# Patient Record
Sex: MALE | Employment: UNEMPLOYED | URBAN - NONMETROPOLITAN AREA
[De-identification: names, ages, dates, MRNs, and addresses within clinical notes are randomized per-mention and may not be internally consistent; named-entity substitution may affect disease eponyms.]

---

## 2017-02-02 ENCOUNTER — TELEMEDICINE2 (OUTPATIENT)
Dept: MEDICAL GROUP | Facility: PHYSICIAN GROUP | Age: 57
End: 2017-02-02
Payer: OTHER GOVERNMENT

## 2017-02-02 VITALS
SYSTOLIC BLOOD PRESSURE: 117 MMHG | OXYGEN SATURATION: 97 % | RESPIRATION RATE: 18 BRPM | HEART RATE: 56 BPM | BODY MASS INDEX: 38.6 KG/M2 | DIASTOLIC BLOOD PRESSURE: 80 MMHG | HEIGHT: 74 IN | WEIGHT: 300.8 LBS

## 2017-02-02 DIAGNOSIS — B20 HIV (HUMAN IMMUNODEFICIENCY VIRUS INFECTION) (HCC): ICD-10-CM

## 2017-02-02 PROCEDURE — 99213 OFFICE O/P EST LOW 20 MIN: CPT | Mod: GT | Performed by: NURSE PRACTITIONER

## 2017-02-02 NOTE — PROGRESS NOTES
"Chief Complaint   Patient presents with   • HIV Positive/AIDS       TELEMEDICINE VISIT    HISTORY OF PRESENT ILLNESS: Patient is a 56 y.o. male established patient, who presents today to discuss:    Verified Identification with patient.    Secured visit with RN presenter @ [facility]    HIV (human immunodeficiency virus infection)  This is a 56 your old male who is here to review labs. He has been under some stress with his family.   CD4 840 and viral load is a 90.  Remainder of labs are within normal limits. Patient is Hep C +, does not have viral load to document whether he has cleared the disease.   HIV ROS     Medication:   Current outpatient prescriptions:   •  Multiple Vitamin (MULTIVITAMINS PO), Take  by mouth., 2/2/2017  •  levothyroxine, 125 mcg, Oral, AM ES, 2/2/2017  •  Levothyroxine Sodium, Take  by mouth., 2/2/2017  •  efavirenz-emtrictabine-tenofovir, 1 Tab, Oral, DAILY, 2/2/2017  •  ibuprofen, 400 mg, Oral, Q6HRS PRN, 2/2/2017    Missed medications: NONE    Weight loss? NONE    Night sweats? NONE  Body aches ?  NONE    Skin ? CLEAR  Nonhealing lesions ? NO  Rash  ? NO  Warts  ? NO     Neuro: No headache, No sensation changes, No dizziness, No confusion.  Cardiac: No cp, No Harden, No peripheral edema. No calf pain at rest.  Pulm: No cough, No sob, No sputum   Gastro: No nausea, No vomiting,No diarrhea, No rectal bleeding, No abdominal pain  : No dysuria. No blisters, No incontinence.   Constitutional : No fevers, No night sweats.  Musculoskeletal: No swelling,redness or pain to joints. Normal ROM and gait  Skin: rashes, lesions, itching  Emotional: depression ? NO  anxiety ? NO            No Known Allergies            ROS: As documented in my HPI      Exam:  Blood pressure 117/80, pulse 56, resp. rate 18, height 1.88 m (6' 2.02\"), weight 136.442 kg (300 lb 12.8 oz), SpO2 97 %.  General:  Well nourished, well developed male in NAD  Head: No lesions, Non tender scalp  Neck: Supple. Thyroid is symmetric. No " lymphadenopathy   Oral Cavity: no thrush or gum lesions  Pulmonary: .  Normal effort. No rales, ronchi, or wheezing via Telesteth system  Cardiovascular: Regular rate and rhythm without murmur.   Extremities: no clubbing, cyanosis, or edema.  Psych: Alert and oriented x3. Normal mood and affect.  Neurological: No focal deficits    Please note that this dictation was created using voice recognition software. I have made every reasonable attempt to correct obvious errors, but I expect that there are errors of grammar and possibly content that I did not discover before finalizing the note.    Assessment/Plan:  1. HIV (human immunodeficiency virus infection) (CMS-HCC)  efavirenz-emtrictabine-tenofovir (ATRIPLA) 600-200-300 MG per tablet   Current status of condition is chronic and controlled on therapy.  Return to clinic in 3 months  Refill HIV meds  Recheck CD4 , VL, CBC, and CMP in 3 months

## 2017-02-02 NOTE — ASSESSMENT & PLAN NOTE
This is a 56 your old male who is here to review labs. He has been under some stress with his family.   CD4 840 and viral load is a 90.  Remainder of labs are within normal limits. Patient is Hep C +, does not have viral load to document whether he has cleared the disease.   HIV ROS     Medication:   Current outpatient prescriptions:   •  Multiple Vitamin (MULTIVITAMINS PO), Take  by mouth., 2/2/2017  •  levothyroxine, 125 mcg, Oral, AM ES, 2/2/2017  •  Levothyroxine Sodium, Take  by mouth., 2/2/2017  •  efavirenz-emtrictabine-tenofovir, 1 Tab, Oral, DAILY, 2/2/2017  •  ibuprofen, 400 mg, Oral, Q6HRS PRN, 2/2/2017    Missed medications: NONE    Weight loss? NONE    Night sweats? NONE  Body aches ?  NONE    Skin ? CLEAR  Nonhealing lesions ? NO  Rash  ? NO  Warts  ? NO     Neuro: No headache, No sensation changes, No dizziness, No confusion.  Cardiac: No cp, No Harden, No peripheral edema. No calf pain at rest.  Pulm: No cough, No sob, No sputum   Gastro: No nausea, No vomiting,No diarrhea, No rectal bleeding, No abdominal pain  : No dysuria. No blisters, No incontinence.   Constitutional : No fevers, No night sweats.  Musculoskeletal: No swelling,redness or pain to joints. Normal ROM and gait  Skin: rashes, lesions, itching  Emotional: depression ? NO  anxiety ? NO

## 2017-07-26 ENCOUNTER — TELEMEDICINE2 (OUTPATIENT)
Dept: MEDICAL GROUP | Facility: PHYSICIAN GROUP | Age: 57
End: 2017-07-26
Payer: OTHER GOVERNMENT

## 2017-07-26 VITALS
BODY MASS INDEX: 38.17 KG/M2 | SYSTOLIC BLOOD PRESSURE: 125 MMHG | TEMPERATURE: 98.4 F | HEART RATE: 56 BPM | OXYGEN SATURATION: 97 % | DIASTOLIC BLOOD PRESSURE: 77 MMHG | HEIGHT: 74 IN | WEIGHT: 297.4 LBS | RESPIRATION RATE: 18 BRPM

## 2017-07-26 DIAGNOSIS — B20 HIV (HUMAN IMMUNODEFICIENCY VIRUS INFECTION) (HCC): ICD-10-CM

## 2017-07-26 DIAGNOSIS — B18.2 HEP C W/ COMA, CHRONIC: ICD-10-CM

## 2017-07-26 PROCEDURE — 99213 OFFICE O/P EST LOW 20 MIN: CPT | Mod: GT | Performed by: NURSE PRACTITIONER

## 2017-07-26 NOTE — ASSESSMENT & PLAN NOTE
This is a 57 year old male. Patient has questions regarding his hepatitis. Reviewed numbers with patient. Reviewed diet and exercise.   HIV ROS     Medication: Atripla   Missed medications:  none    CD4 799   Viral Load < 20   Kidney Function normal      Weight loss?  3 lbs     Night sweats? No   Body aches ?   No     Skin ?  Clear  Nonhealing lesions ?  No  Rash  ?  No  Warts  ?  No     Neuro: No headache, No sensation changes, No dizziness, No confusion.  Cardiac: No cp, No Harden, No peripheral edema. No calf pain at rest.  Pulm: No cough, No sob, No sputum   Gastro: No nausea, No vomiting,No diarrhea, No rectal bleeding, No abdominal pain  : No dysuria. No blisters, No incontinence.   Constitutional : No fevers, No night sweats.  Musculoskeletal: No swelling,redness or pain to joints. Normal ROM and gait  Skin: rashes, lesions, itching  Emotional: depression ? No  anxiety ?  No    Psych: hallucinations ?   No   Auditory hallucinations ?  No  Paranoid  ?  No     DRUG ---DRUG interaction? No     Patient is almost not believing that his labs are so good.

## 2017-07-26 NOTE — PROGRESS NOTES
"Chief Complaint   Patient presents with   • HIV Positive/AIDS           HISTORY OF PRESENT ILLNESS: Patient is a 57 y.o. male established patient, who presents today to discuss    Verified Identification with patient.   RN presenter @  [facility]    Hep C w/ coma, chronic  Patient had Hepatitis viral load     HIV (human immunodeficiency virus infection)  This is a 57 year old male. Patient has questions regarding his hepatitis. Reviewed numbers with patient. Reviewed diet and exercise.   HIV ROS     Medication: Atripla   Missed medications:  none    CD4 799   Viral Load < 20   Kidney Function normal      Weight loss?  3 lbs     Night sweats? No   Body aches ?   No     Skin ?  Clear  Nonhealing lesions ?  No  Rash  ?  No  Warts  ?  No     Neuro: No headache, No sensation changes, No dizziness, No confusion.  Cardiac: No cp, No Harden, No peripheral edema. No calf pain at rest.  Pulm: No cough, No sob, No sputum   Gastro: No nausea, No vomiting,No diarrhea, No rectal bleeding, No abdominal pain  : No dysuria. No blisters, No incontinence.   Constitutional : No fevers, No night sweats.  Musculoskeletal: No swelling,redness or pain to joints. Normal ROM and gait  Skin: rashes, lesions, itching  Emotional: depression ? No  anxiety ?  No    Psych: hallucinations ?   No   Auditory hallucinations ?  No  Paranoid  ?  No     DRUG ---DRUG interaction? No     Patient is almost not believing that his labs are so good.         Patient - states that his glass prescription is not working - needs to follow up with EYE doctor.   No Known Allergies            ROS: As documented in my HPI      Exam:  Blood pressure 125/77, pulse 56, temperature 36.9 °C (98.4 °F), resp. rate 18, height 1.88 m (6' 2.02\"), weight 134.9 kg (297 lb 6.4 oz), SpO2 97 %.  General:  Well nourished, well developed male in NAD  Head: No lesions, Non tender scalp  Neck: Supple. Thyroid is symmetric. No lymphadenopathy   Oral Cavity: no thrush or gum lesions  Pulmonary: . "  Normal effort. No rales, ronchi, or wheezing via Telesteth system  Cardiovascular: Regular rate and rhythm without murmur.   Extremities: no clubbing, cyanosis, or edema.  Psych: Alert and oriented x3. Normal mood and affect.  Neurological: No focal deficits    Please note that this dictation was created using voice recognition software. I have made every reasonable attempt to correct obvious errors, but I expect that there are errors of grammar and possibly content that I did not discover before finalizing the note.    Assessment/Plan:  1. Hep C w/ coma, chronic (CMS-HCC)  Viral load detected. No janak type, but will monitored   2. HIV (human immunodeficiency virus infection) (CMS-HCC)  Current status of condition is chronic and controlled on therapy.  Return to clinic in 3 months  Refill HIV meds  Recheck CD4 , VL, CBC, and CMP in 3 months

## 2017-10-25 ENCOUNTER — TELEMEDICINE2 (OUTPATIENT)
Dept: MEDICAL GROUP | Facility: PHYSICIAN GROUP | Age: 57
End: 2017-10-25
Payer: OTHER GOVERNMENT

## 2017-10-25 VITALS
OXYGEN SATURATION: 97 % | SYSTOLIC BLOOD PRESSURE: 110 MMHG | TEMPERATURE: 97.1 F | DIASTOLIC BLOOD PRESSURE: 74 MMHG | HEIGHT: 74 IN | HEART RATE: 66 BPM | WEIGHT: 302.8 LBS | BODY MASS INDEX: 38.86 KG/M2 | RESPIRATION RATE: 18 BRPM

## 2017-10-25 DIAGNOSIS — B18.2 HEP C W/ COMA, CHRONIC: ICD-10-CM

## 2017-10-25 DIAGNOSIS — B20 HIV (HUMAN IMMUNODEFICIENCY VIRUS INFECTION) (HCC): ICD-10-CM

## 2017-10-25 DIAGNOSIS — R79.89 ABNORMAL CBC: ICD-10-CM

## 2017-10-25 PROCEDURE — 99213 OFFICE O/P EST LOW 20 MIN: CPT | Mod: GT | Performed by: NURSE PRACTITIONER

## 2017-10-25 NOTE — PROGRESS NOTES
Chief Complaint   Patient presents with   • HIV Positive/AIDS           HISTORY OF PRESENT ILLNESS: Patient is a 57 y.o. male established patient, who presents today to discuss Labs     Verified Identification with patient.   RN presenter @ [facility]    HIV (human immunodeficiency virus infection)  This is a 57 year old with HIV. Recent development of abnormal of  CBC.  WBC : 2.95  HCT 33.5  Hgb 12.0  Platelets - 140  Reviewed with patient. Patient has been taking Ibuprofen on a semi-regular basis for headache.  These levels are very different from his levels in the past.  Will recheck today and proceed if he needs to have follow up.    HIV ROS     Medication: atripla   Missed medications:  None     CD4: 687  Viral Load < 20   Kidney Function: normal     Weight loss?  None     Night sweats?  None  Fatigue is present.   Body aches ?  NO     Skin ? Clear  Nonhealing lesions ? NO  Rash  ?  NO  Warts  ?  NO     Neuro: No headache, No sensation changes, No dizziness, No confusion.  Cardiac: No cp, No Harden, No peripheral edema. No calf pain at rest.  Pulm: No cough, No sob, No sputum   Gastro: No nausea, No vomiting,No diarrhea, No rectal bleeding, No abdominal pain  : No dysuria. No blisters, No incontinence.   Constitutional : No fevers, No night sweats.  Musculoskeletal: No swelling,redness or pain to joints. Normal ROM and gait  Skin: rashes, lesions, itching  Emotional: depression ?  NO  anxiety ?  Yes with news of low blood count    Psych: hallucinations ?  NO   Auditory hallucinations ?  NO  Paranoid  ?  NO     DRUG ---DRUG interaction? No    No rectal bleeding  Not sure of change in stool - as he does not look.  No bruises  No nose bleeds          Hep C w/ coma, chronic  No abdominal pain.  Change in CBC, but normal Liver enzymes.  Will repeat CBC.     Abnormal CBC  Recent CBC shows low : Hgb, HCT, platelets, WBC, and fractions. Not critical levels - see Media.  But significant changes. Will recheck and refer to [facility]  "provider.     No Known Allergies            ROS: As documented in my HPI      Exam:  Blood pressure 110/74, pulse 66, temperature 36.2 °C (97.1 °F), resp. rate 18, height 1.88 m (6' 2\"), weight (!) 137.3 kg (302 lb 12.8 oz), SpO2 97 %.  General:  Well nourished, well developed male in NAD  Head: No lesions, Non tender scalp  Eyes: conjunctiva - pale bilateral   Neck: Supple. Thyroid is symmetric. No lymphadenopathy   Oral Cavity: no thrush or gum lesions  Pulmonary: .  Normal effort. No rales, ronchi, or wheezing via Telesteth system  Cardiovascular: Regular rate and rhythm without murmur.   Extremities: no clubbing, cyanosis, or edema.  Psych: Alert and oriented x3. Normal mood and affect.  Neurological: No focal deficits    Please note that this dictation was created using voice recognition software. I have made every reasonable attempt to correct obvious errors, but I expect that there are errors of grammar and possibly content that I did not discover before finalizing the note.    Assessment/Plan:  1. HIV (human immunodeficiency virus infection) (CMS-[facility])  efavirenz-emtrictabine-tenofovir (ATRIPLA) 600-200-300 MG per tablet  Return to clinic in 3 months  Refill HIV meds  Recheck CD4 , VL, CBC, and CMP in 3 months  Current status of condition is chronic and controlled on therapy.     2. Hep C w/ coma, chronic (CMS-[facility])  Normal liver enzymes   3. Abnormal CBC   New problem - not controlled. Recheck CBC, see medical provider at [facility] for follow up.             "

## 2017-10-25 NOTE — ASSESSMENT & PLAN NOTE
This is a 57 year old with HIV. Recent development of abnormal of  CBC.  WBC : 2.95  HCT 33.5  Hgb 12.0  Platelets - 140  Reviewed with patient. Patient has been taking Ibuprofen on a semi-regular basis for headache.  These levels are very different from his levels in the past.  Will recheck today and proceed if he needs to have follow up.    HIV ROS     Medication: atripla   Missed medications:  None     CD4: 687  Viral Load < 20   Kidney Function: normal     Weight loss?  None     Night sweats?  None  Fatigue is present.   Body aches ?  NO     Skin ? Clear  Nonhealing lesions ? NO  Rash  ?  NO  Warts  ?  NO     Neuro: No headache, No sensation changes, No dizziness, No confusion.  Cardiac: No cp, No Harden, No peripheral edema. No calf pain at rest.  Pulm: No cough, No sob, No sputum   Gastro: No nausea, No vomiting,No diarrhea, No rectal bleeding, No abdominal pain  : No dysuria. No blisters, No incontinence.   Constitutional : No fevers, No night sweats.  Musculoskeletal: No swelling,redness or pain to joints. Normal ROM and gait  Skin: rashes, lesions, itching  Emotional: depression ?  NO  anxiety ?  Yes with news of low blood count    Psych: hallucinations ?  NO   Auditory hallucinations ?  NO  Paranoid  ?  NO     DRUG ---DRUG interaction? No    No rectal bleeding  Not sure of change in stool - as he does not look.  No bruises  No nose bleeds

## 2018-02-07 ENCOUNTER — TELEMEDICINE2 (OUTPATIENT)
Dept: MEDICAL GROUP | Facility: PHYSICIAN GROUP | Age: 58
End: 2018-02-07
Payer: OTHER GOVERNMENT

## 2018-02-07 VITALS
HEART RATE: 61 BPM | OXYGEN SATURATION: 96 % | DIASTOLIC BLOOD PRESSURE: 77 MMHG | TEMPERATURE: 97.1 F | HEIGHT: 74 IN | RESPIRATION RATE: 18 BRPM | BODY MASS INDEX: 36.7 KG/M2 | WEIGHT: 286 LBS | SYSTOLIC BLOOD PRESSURE: 114 MMHG

## 2018-02-07 DIAGNOSIS — B18.2 HEP C W/ COMA, CHRONIC: ICD-10-CM

## 2018-02-07 DIAGNOSIS — R63.4 WEIGHT LOSS: ICD-10-CM

## 2018-02-07 DIAGNOSIS — R79.89 ABNORMAL CBC: ICD-10-CM

## 2018-02-07 DIAGNOSIS — B20 HIV (HUMAN IMMUNODEFICIENCY VIRUS INFECTION) (HCC): ICD-10-CM

## 2018-02-07 DIAGNOSIS — E03.9 HYPOTHYROIDISM, UNSPECIFIED TYPE: ICD-10-CM

## 2018-02-07 PROCEDURE — 99213 OFFICE O/P EST LOW 20 MIN: CPT | Mod: GT | Performed by: NURSE PRACTITIONER

## 2018-02-07 NOTE — ASSESSMENT & PLAN NOTE
Patient is now having thyroid adjusted - patient is now on 300 mcg daily.  Apparently recent TSH was at 12. Patient has been taking the new dose for about 4 months.

## 2018-02-08 NOTE — ASSESSMENT & PLAN NOTE
HIV ROS     Medication: atripla  Missed medications:  none    CD4: 740   Viral Load < 20   Kidney Function: normal  Abnormal CBC    Weight loss?  YES, purposefully     Night sweats?  No   Body aches ?   No     Skin ?  Clear  Nonhealing lesions ? No  Rash  ? No  Warts  ? No     Neuro: No headache, No sensation changes, No dizziness, No confusion.  Cardiac: No cp, No Harden, No peripheral edema. No calf pain at rest.  Pulm: No cough, No sob, No sputum   Gastro: No nausea, No vomiting,No diarrhea, No rectal bleeding, No abdominal pain  : No dysuria. No blisters, No incontinence.   Constitutional : No fevers, No night sweats.  Musculoskeletal: No swelling,redness or pain to joints. Normal ROM and gait  Skin: rashes, lesions, itching  Emotional: depression ? No  anxiety ? Yes over health    Psych: hallucinations ?   No   Auditory hallucinations ? No  Paranoid  ? No     DRUG ---DRUG interaction? No

## 2018-02-08 NOTE — ASSESSMENT & PLAN NOTE
Recent CBC: RBC -2.80 ( lower)  hgb 11.8  hct 32.7  Mcv: 117  Mch: 36.1  Platelets: 122    Patient is fatigued. States he is losing weight - purposefully. Cutting out Carbs. Denies black stool. Nose bleeds. Bruising.

## 2018-02-08 NOTE — PROGRESS NOTES
Chief Complaint   Patient presents with   • HIV Positive/AIDS           HISTORY OF PRESENT ILLNESS: Patient is a 57 y.o. male established patient, who presents today to discuss labs    Verified Identification with patient.   RN presenter @ [facility]    Hypothyroid  Patient is now having thyroid adjusted - patient is now on 300 mcg daily.  Apparently recent TSH was at 12. Patient has been taking the new dose for about 4 months.     Weight loss  Vitals 2/2/2017 4/27/2017 7/26/2017 10/25/2017 2/7/2018   WEIGHT 300.8 300 297.4 302.8 286       Hep C w/ coma, chronic  Normal liver enzymes.  Alkaline Phos   CBC is abnormal.     Abnormal CBC  Recent CBC: RBC -2.80 ( lower)  hgb 11.8  hct 32.7  Mcv: 117  Mch: 36.1  Platelets: 122    Patient is fatigued. States he is losing weight - purposefully. Cutting out Carbs. Denies black stool. Nose bleeds. Bruising.       HIV (human immunodeficiency virus infection)  HIV ROS     Medication: atripla  Missed medications:  none    CD4: 740   Viral Load < 20   Kidney Function: normal  Abnormal CBC    Weight loss?  YES, purposefully     Night sweats?  No   Body aches ?   No     Skin ?  Clear  Nonhealing lesions ? No  Rash  ? No  Warts  ? No     Neuro: No headache, No sensation changes, No dizziness, No confusion.  Cardiac: No cp, No Harden, No peripheral edema. No calf pain at rest.  Pulm: No cough, No sob, No sputum   Gastro: No nausea, No vomiting,No diarrhea, No rectal bleeding, No abdominal pain  : No dysuria. No blisters, No incontinence.   Constitutional : No fevers, No night sweats.  Musculoskeletal: No swelling,redness or pain to joints. Normal ROM and gait  Skin: rashes, lesions, itching  Emotional: depression ? No  anxiety ? Yes over health    Psych: hallucinations ?   No   Auditory hallucinations ? No  Paranoid  ? No     DRUG ---DRUG interaction? No       No Known Allergies            ROS: As documented in my HPI      Exam:  Blood pressure 114/77, pulse 61, temperature 36.2 °C (97.1  "°F), resp. rate 18, height 1.88 m (6' 2\"), weight (!) 129.7 kg (286 lb), SpO2 96 %.  General:  Well nourished, well developed male in NAD PALE  Head: No lesions, Non tender scalp  Neck: Supple. Thyroid is symmetric. No lymphadenopathy   Oral Cavity: no thrush or gum lesions  Pulmonary: .  Normal effort. No rales, ronchi, or wheezing via Telesteth system  Cardiovascular: Regular rate and rhythm without murmur.   Extremities: no clubbing, cyanosis, or edema.  Psych: Alert and oriented x3. Normal mood and affect.  Neurological: No focal deficits    Please note that this dictation was created using voice recognition software. I have made every reasonable attempt to correct obvious errors, but I expect that there are errors of grammar and possibly content that I did not discover before finalizing the note.    Assessment/Plan:  1. Hypothyroidism, unspecified type   Not controlled on 300 mcg will go back to [facility].   2. Weight loss  Monitor   3. Hep C w/ coma, chronic (CMS-HCC)  Stable Liver enzymes   4. Abnormal CBC  Not controlled - ANEMIA   5. HIV (human immunodeficiency virus infection) (CMS-HCC)  Current status of condition is chronic and controlled on therapy.  Return to clinic in 3 months  Refill HIV meds  Recheck CD4 , VL, CBC, and CMP in 3 months  Current status of condition is chronic and controlled on therapy.        Patient to follow up with [facility].   Suggested stool test for blood.   Readjust thyroid medication.   Work up anemia.       "

## 2018-09-27 ENCOUNTER — TELEMEDICINE2 (OUTPATIENT)
Dept: MEDICAL GROUP | Facility: PHYSICIAN GROUP | Age: 58
End: 2018-09-27
Payer: OTHER GOVERNMENT

## 2018-09-27 VITALS
HEART RATE: 52 BPM | TEMPERATURE: 97.9 F | SYSTOLIC BLOOD PRESSURE: 131 MMHG | BODY MASS INDEX: 36.57 KG/M2 | DIASTOLIC BLOOD PRESSURE: 77 MMHG | WEIGHT: 285 LBS | HEIGHT: 74 IN | OXYGEN SATURATION: 97 %

## 2018-09-27 DIAGNOSIS — B20 HIV (HUMAN IMMUNODEFICIENCY VIRUS INFECTION) (HCC): ICD-10-CM

## 2018-09-27 DIAGNOSIS — E53.8 B12 DEFICIENCY: ICD-10-CM

## 2018-09-27 PROCEDURE — 99213 OFFICE O/P EST LOW 20 MIN: CPT | Performed by: NURSE PRACTITIONER

## 2018-09-27 RX ORDER — CYANOCOBALAMIN 1000 UG/ML
1000 INJECTION, SOLUTION INTRAMUSCULAR; SUBCUTANEOUS
Refills: 0 | Status: SHIPPED | DISCHARGE
Start: 2018-09-27

## 2018-09-27 RX ORDER — LEVOTHYROXINE SODIUM 0.15 MG/1
300 TABLET ORAL
COMMUNITY

## 2018-09-27 NOTE — ASSESSMENT & PLAN NOTE
Patient was hospitalized for anemia. Found out he had B-12 deficiency.  HIV ROS     Medication: ATRIPLA   Missed medications: no .    CD4: 844   Viral Load: < 20   Kidney Function normal     Weight loss? no .    Night sweats?no .   Body aches ?  no .    Skin ? no rashes, no lesions, no petechiae or ecchymosis, no blisters or vesicles   Neuro: No headache, No sensation changes, No dizziness, No confusion.  Cardiac: No cp, No Harden, No peripheral edema. No calf pain at rest.  Pulm: No cough, No sob, No sputum   Gastro: No nausea, No vomiting,No diarrhea, No rectal bleeding, No abdominal pain  : No dysuria. No blisters, No incontinence.   Constitutional : No fevers, No night sweats.  Musculoskeletal: No swelling,redness or pain to joints. Normal ROM and gait  Skin: rashes, lesions, itching  PHQ 2 negative Depression Screening    Little interest or pleasure in doing things?     Feeling down, depressed , or hopeless?    Trouble falling or staying asleep, or sleeping too much?     Feeling tired or having little energy?     Poor appetite or overeating?     Feeling bad about yourself - or that you are a failure or have let yourself or your family down?    Trouble concentrating on things, such as reading the newspaper or watching television?    Moving or speaking so slowly that other people could have noticed.  Or the opposite - being so fidgety or restless that you have been moving around a lot more than usual?     Thoughts that you would be better off dead, or of hurting yourself?     Patient Health Questionnaire Score:        If depressive symptoms identified deferred to follow up visit unless specifically addressed in assesment and plan.    Interpretation of PHQ-9 Total Score   Score Severity   1-4 No Depression   5-9 Mild Depression   10-14 Moderate Depression   15-19 Moderately Severe Depression   20-27 Severe Depression    Psych: hallucinations ? no .     Auditory hallucinations ? no .   Paranoid  ? no .    DRUG  ---DRUG interaction? no .

## 2018-09-27 NOTE — ASSESSMENT & PLAN NOTE
Patient was identified has having b-12 deficiency. Required blood transfusion of 4 units. Hospitalization for 6 days in April. Now normal CBC.

## 2018-09-27 NOTE — PROGRESS NOTES
B12 deficiency  Patient was identified has having b-12 deficiency. Required blood transfusion of 4 units. Hospitalization for 6 days in April. Now normal CBC.     HIV (human immunodeficiency virus infection)  Patient was hospitalized for anemia. Found out he had B-12 deficiency.  HIV ROS     Medication: ATRIPLA   Missed medications: no .    CD4: 844   Viral Load: < 20   Kidney Function normal     Weight loss? no .    Night sweats?no .   Body aches ?  no .    Skin ? no rashes, no lesions, no petechiae or ecchymosis, no blisters or vesicles   Neuro: No headache, No sensation changes, No dizziness, No confusion.  Cardiac: No cp, No Harden, No peripheral edema. No calf pain at rest.  Pulm: No cough, No sob, No sputum   Gastro: No nausea, No vomiting,No diarrhea, No rectal bleeding, No abdominal pain  : No dysuria. No blisters, No incontinence.   Constitutional : No fevers, No night sweats.  Musculoskeletal: No swelling,redness or pain to joints. Normal ROM and gait  Skin: rashes, lesions, itching  PHQ 2 negative Depression Screening    Little interest or pleasure in doing things?     Feeling down, depressed , or hopeless?    Trouble falling or staying asleep, or sleeping too much?     Feeling tired or having little energy?     Poor appetite or overeating?     Feeling bad about yourself - or that you are a failure or have let yourself or your family down?    Trouble concentrating on things, such as reading the newspaper or watching television?    Moving or speaking so slowly that other people could have noticed.  Or the opposite - being so fidgety or restless that you have been moving around a lot more than usual?     Thoughts that you would be better off dead, or of hurting yourself?     Patient Health Questionnaire Score:        If depressive symptoms identified deferred to follow up visit unless specifically addressed in assesment and plan.    Interpretation of PHQ-9 Total Score   Score Severity   1-4 No Depression    5-9 Mild Depression   10-14 Moderate Depression   15-19 Moderately Severe Depression   20-27 Severe Depression    Psych: hallucinations ? no .     Auditory hallucinations ? no .   Paranoid  ? no .    DRUG ---DRUG interaction? no .       Chief Complaint   Patient presents with   • HIV Positive/AIDS           HISTORY OF PRESENT ILLNESS: Patient is a 58 y.o. male established patient, who presents today to discuss labs and recent illness     Verified Identification with patient.   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 Required blood transfusion of 4 units. Hospitalization for 6 days in April. Now normal CBC.   Platelets are 140 ( slightly low)  H/H are normal.      HIV (human immunodeficiency virus infection)  Patient was hospitalized for anemia. Found out he had B-12 deficiency.  HIV ROS     Medication: ATRIPLA   Missed medications: no .    CD4: 844   Viral Load: < 20   Kidney Function normal  Liver enzymes are normal.      Weight loss? Yes dietary changes .    Night sweats?no .   Body aches ?  no .    Skin ? no rashes, no lesions, no petechiae or ecchymosis, no blisters or vesicles   Neuro: No headache, No sensation changes, No dizziness, No confusion.  Cardiac: No cp, No Harden, No peripheral edema. No calf pain at rest.  Pulm: No cough, No sob, No sputum   Gastro: No nausea, No vomiting,No diarrhea, No rectal bleeding, No abdominal pain  : No dysuria. No blisters, No incontinence.   Constitutional : No fevers, No night sweats.  Musculoskeletal: No swelling,redness or pain to joints. Normal ROM and gait  Skin: rashes, lesions, itching  PHQ 2 negative Depression Screening    Little interest or pleasure in doing things?     Feeling down, depressed , or hopeless?    Trouble falling or staying asleep, or sleeping too much?     Feeling tired or having little energy?     Poor appetite or overeating?     Feeling bad about yourself - or that you are a failure or have let  "yourself or your family down?    Trouble concentrating on things, such as reading the newspaper or watching television?    Moving or speaking so slowly that other people could have noticed.  Or the opposite - being so fidgety or restless that you have been moving around a lot more than usual?     Thoughts that you would be better off dead, or of hurting yourself?     Patient Health Questionnaire Score:        If depressive symptoms identified deferred to follow up visit unless specifically addressed in assesment and plan.    Interpretation of PHQ-9 Total Score   Score Severity   1-4 No Depression   5-9 Mild Depression   10-14 Moderate Depression   15-19 Moderately Severe Depression   20-27 Severe Depression    Psych: hallucinations ? no .     Auditory hallucinations ? no .   Paranoid  ? no .    DRUG ---DRUG interaction? no .       No Known Allergies            ROS: As documented in my HPI      Exam:  Blood pressure 131/77, pulse (!) 52, temperature 36.6 °C (97.9 °F), height 1.88 m (6' 2\"), weight (!) 129.3 kg (285 lb), SpO2 97 %.  General:  Well nourished, well developed male in NAD  Head: No lesions, Non tender scalp  Neck: Supple. Thyroid is symmetric. No lymphadenopathy   Oral Cavity: no thrush or gum lesions  Pulmonary: .  Normal effort. No rales, ronchi, or wheezing via Telesteth system  Cardiovascular: Regular rate and rhythm without murmur.   Extremities: 1+ pitting edema.   Psych: Alert and oriented x3. Normal mood and affect.  Neurological: No focal deficits    Please note that this dictation was created using voice recognition software. I have made every reasonable attempt to correct obvious errors, but I expect that there are errors of grammar and possibly content that I did not discover before finalizing the note.    Assessment/Plan:  1. B12 deficiency - Current status of condition is chronic and controlled on therapy.  Need to follow [facility] primary care cyanocobalamin (VITAMIN B-12) 1000 MCG/ML Solution "   2. HIV (human immunodeficiency virus infection) (HCC) - Current status of condition is chronic and controlled on therapy.

## 2018-10-24 ENCOUNTER — TELEMEDICINE2 (OUTPATIENT)
Dept: MEDICAL GROUP | Facility: PHYSICIAN GROUP | Age: 58
End: 2018-10-24
Payer: OTHER GOVERNMENT

## 2018-10-24 VITALS
OXYGEN SATURATION: 96 % | TEMPERATURE: 98 F | HEART RATE: 98 BPM | RESPIRATION RATE: 18 BRPM | SYSTOLIC BLOOD PRESSURE: 123 MMHG | BODY MASS INDEX: 36.34 KG/M2 | WEIGHT: 283 LBS | DIASTOLIC BLOOD PRESSURE: 79 MMHG

## 2018-10-24 DIAGNOSIS — B20 HIV (HUMAN IMMUNODEFICIENCY VIRUS INFECTION) (HCC): ICD-10-CM

## 2018-10-24 DIAGNOSIS — E03.9 HYPOTHYROIDISM, UNSPECIFIED TYPE: ICD-10-CM

## 2018-10-24 DIAGNOSIS — B18.2 HEP C W/ COMA, CHRONIC: ICD-10-CM

## 2018-10-24 DIAGNOSIS — E53.8 B12 DEFICIENCY: ICD-10-CM

## 2018-10-24 PROCEDURE — 99213 OFFICE O/P EST LOW 20 MIN: CPT | Performed by: NURSE PRACTITIONER

## 2018-10-24 NOTE — ASSESSMENT & PLAN NOTE
HIV ROS     Medication:   Current Outpatient Prescriptions:   •  levothyroxine, 300 mcg, Oral, AM ES, Taking  •  cyanocobalamin, 1,000 mcg, Intramuscular, Q30 DAYS, Taking  •  efavirenz-emtrictabine-tenofovir, 1 Tab, Oral, DAILY, Taking  •  Influenza Vac Split Quad, 0.5 mL, Intramuscular, Once, Taking  •  Multiple Vitamin (MULTIVITAMINS PO), Take  by mouth., Taking  •  ibuprofen, 400 mg, Oral, Q6HRS PRN, 7/26/2017    Missed medications: no .    CD4 794   Viral Load 20   Kidney Function normal     Weight loss? Yes continued.     Night sweats?no .   Body aches ?  no .    Skin ? no rashes, no lesions, no petechiae or ecchymosis   Neuro: No headache, No sensation changes, No dizziness, No confusion.  Cardiac: No cp, No Harden, No peripheral edema. No calf pain at rest.  Pulm: No cough, No sob, No sputum   Gastro: No nausea, No vomiting,No diarrhea, No rectal bleeding, No abdominal pain  : No dysuria. No blisters, No incontinence.   Constitutional : No fevers, No night sweats.  Musculoskeletal: No swelling,redness or pain to joints. Normal ROM and gait  Skin: rashes, lesions, itching  PHQ 2 negative Depression Screening    Little interest or pleasure in doing things?      Feeling down, depressed , or hopeless?     Trouble falling or staying asleep, or sleeping too much?      Feeling tired or having little energy?      Poor appetite or overeating?      Feeling bad about yourself - or that you are a failure or have let yourself or your family down?     Trouble concentrating on things, such as reading the newspaper or watching television?     Moving or speaking so slowly that other people could have noticed.  Or the opposite - being so fidgety or restless that you have been moving around a lot more than usual?      Thoughts that you would be better off dead, or of hurting yourself?      Patient Health Questionnaire Score:         If depressive symptoms identified deferred to follow up visit unless specifically addressed in  assesment and plan.    Interpretation of PHQ-9 Total Score   Score Severity   1-4 No Depression   5-9 Mild Depression   10-14 Moderate Depression   15-19 Moderately Severe Depression   20-27 Severe Depression      Psych: hallucinations ? no .     Auditory hallucinations ? no .   Paranoid  ? no .    DRUG ---DRUG interaction? no .

## 2018-10-24 NOTE — PROGRESS NOTES
Chief Complaint   Patient presents with   • HIV Positive/AIDS           HISTORY OF PRESENT ILLNESS: Patient is a 58 y.o. male established patient, who presents today to discuss labs     Verified Identification with patient.   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    CD4 794   Viral Load 20   Kidney Function normal     Weight loss? Yes continued.     Night sweats?no .   Body aches ?  no .    Skin ? no rashes, no lesions, no petechiae or ecchymosis   Neuro: No headache, No sensation changes, No dizziness, No confusion.  Cardiac: No cp, No Harden, No peripheral edema. No calf pain at rest.  Pulm: No cough, No sob, No sputum   Gastro: No nausea, No vomiting,No diarrhea, No rectal bleeding, No abdominal pain  : No dysuria. No blisters, No incontinence.   Constitutional : No fevers, No night sweats.  Musculoskeletal: No swelling,redness or pain to joints. Normal ROM and gait  Skin: rashes, lesions, itching       Psych: hallucinations ? no .     Auditory hallucinations ? no .   Paranoid  ? no .    DRUG ---DRUG interaction? no .      B12 deficiency  Continues to receive B-12 injections.  Numbers are improved.  No anemia.     Hep C w/ coma, chronic  Liver enzymes are normalized.     Hypothyroid  Patient continues to be monitored by [facility].     No Known Allergies            ROS: As documented in my HPI      Exam:  Blood pressure 123/79, pulse 98, temperature 36.7 °C (98 °F), resp. rate 18, weight (!) 128.4 kg (283 lb), SpO2 96 %.  General:  Well nourished,  well developed male in NAD  Head: No lesions, Non tender scalp  Neck: Supple. Thyroid is symmetric. No lymphadenopathy   Oral Cavity: no thrush or gum lesions  Pulmonary: .  Normal effort.   Cardiovascular: Regular rate   Extremities: no clubbing, cyanosis, or edema.  Psych: Alert and oriented x3. Normal mood and affect.  Neurological: No focal deficits    Please note that this dictation was created using voice recognition software. I have made every reasonable attempt to correct obvious errors, but I expect that there are errors of grammar and possibly content that I did not discover before finalizing the note.    Assessment/Plan:  1. HIV (human immunodeficiency virus infection) (HCC)   Current status of condition is chronic and controlled on therapy.  Return to clinic in 3 months  Refill HIV meds  Recheck CD4 , VL, CBC, and CMP in 3 months     2. B12 deficiency  Current status of condition is chronic and controlled on therapy.   3. Hep C w/ coma, chronic (HCC)  IN HEP C protocol monitoring only    4. Hypothyroidism, unspecified type   Current status of condition is chronic and controlled on therapy.

## 2019-03-04 NOTE — ASSESSMENT & PLAN NOTE
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 Please be proactive and call before your medication runs out. The pharmacy will then contact us for the refill. Please allow 24-48 hours for the refill to be processed.     If your physician has ordered additional laboratory or radiology testing as part of your ongoing plan of care, please allow 5-7 business days from the day of your lab draw or test for the results to be sent and reviewed by your provider. If your results are critical and require more immediate intervention, you will be contacted sooner. Your results will be conveyed to you via a phone call or letter.    You may be receiving a patient satisfaction survey in the mail or in your email.  If you receive an email survey, please look for the subject line of:   \" Your provider name\" would like your feedback\".   Please take the time to complete your survey either via the mail or email, as your feedback is very important to us.  We strive to make your experience exceptional and your comments help us with that goal.  We look forward to hearing from you.            Recent CBC shows low : Hgb, HCT, platelets, WBC, and fractions. Not critical levels - see Media.  But significant changes. Will recheck and refer to [facility] provider.

## 2019-04-24 ENCOUNTER — TELEMEDICINE2 (OUTPATIENT)
Dept: VASCULAR LAB | Facility: MEDICAL CENTER | Age: 59
End: 2019-04-24
Attending: INTERNAL MEDICINE
Payer: OTHER GOVERNMENT

## 2019-04-24 DIAGNOSIS — B19.20 HEPATITIS C VIRUS INFECTION WITHOUT HEPATIC COMA, UNSPECIFIED CHRONICITY: ICD-10-CM

## 2019-04-24 DIAGNOSIS — B20 HIV (HUMAN IMMUNODEFICIENCY VIRUS INFECTION) (HCC): ICD-10-CM

## 2019-04-24 PROCEDURE — 99205 OFFICE O/P NEW HI 60 MIN: CPT | Performed by: INTERNAL MEDICINE

## 2019-04-24 NOTE — PROGRESS NOTES
Date of Service: 4/24/2019    Consult Requested By: NING    Reason for Consultation: HIV    History of Present Illness:   Neeraj Galvez is a 59 y.o.  Pt has a past medical history of HIV and HCV untreated. He also has hypothyroidism and B12 deficiency for which he is receiving injections and was admitted for anemia in April 2018 requiring transfusions.  He was diagnosed with HIV in 2006 and has been treated with Atripla since that time.  He was diagnosed with hepatitis C in approximately 2004.  He reports no missed doses of the Atripla.  His viral load is typically undetectable but was 80 on last labs.  This likely represents a blip but we will continue to monitor.     Today he is complaining of generalized fatigue, some chronic back pain and chronic lower extremity edema.      Current ART: Atripla   Prior HIV treatment: None   Resistance: Unknown   Diagnosed with HIV: 2006  Risk factors:  MSW  HIV CD4 / viral load at start of treatment: Unknown       Review Of Systems:  Review of Systems   Constitutional: Positive for malaise/fatigue. Negative for chills and fever.   HENT: Negative for hearing loss.    Eyes: Negative for blurred vision and double vision.   Respiratory: Negative for cough and shortness of breath.    Cardiovascular: Negative for chest pain.   Gastrointestinal: Negative for abdominal pain, constipation, diarrhea, nausea and vomiting.   Genitourinary: Negative for dysuria.   Musculoskeletal: Positive for back pain and myalgias.   Skin: Negative for itching and rash.   Neurological: Negative for headaches.   Endo/Heme/Allergies: Bruises/bleeds easily.   Psychiatric/Behavioral: The patient is not nervous/anxious.      PMH:   See HPI      FAMILY HX:  Father with colon cancer and diabetes  Mother with diabetes    SOCIAL HX:  ETOH: Denies   Tobacco: Quit 2004   Drug use: Extensive drug use history including IV drug use  Sexual activity: Denies    Allergies/Intolerances:  No Known Allergies        Other  Current Medications:    Current Outpatient Prescriptions:   •  levothyroxine (SYNTHROID) 150 MCG Tab, Take 300 mcg by mouth Every morning on an empty stomach., Disp: , Rfl:   •  cyanocobalamin (VITAMIN B-12) 1000 MCG/ML Solution, 1 mL by Intramuscular route every 30 days., Disp: , Rfl: 0  •  efavirenz-emtrictabine-tenofovir (ATRIPLA) 600-200-300 MG per tablet, Take 1 Tab by mouth every day., Disp: 30 Tab, Rfl: 11  •  Influenza Vac Split Quad (FLUZONE/FLUARIX) 0.5 ML Suspension injection, 0.5 mL by Intramuscular route Once., Disp: , Rfl:   •  Multiple Vitamin (MULTIVITAMINS PO), Take  by mouth., Disp: , Rfl:   •  ibuprofen (MOTRIN) 400 MG TABS, Take 400 mg by mouth every 6 hours as needed. Indications: Migraine Headache, Disp: , Rfl:       Most Recent Vital Signs:  Temp:  97.8  HR:  58  BP: 137/84  Ox: 97%    Physical Exam   This consultation was conducted utilizing secure and encrypted videoconferencing equipment with the assistance of a trained tele-presenter at the originating site.       Physical Exam   Constitutional: He is oriented to person, place, and time and well-developed, well-nourished, and in no distress.   HENT:   Head: Normocephalic and atraumatic.   Mouth/Throat: Oropharynx is clear and moist.   Eyes: Pupils are equal, round, and reactive to light. Conjunctivae and EOM are normal.   Cardiovascular: Normal rate, regular rhythm and normal heart sounds.    Pulmonary/Chest: Effort normal and breath sounds normal. No respiratory distress. He has no wheezes. He has no rales.   Abdominal: Soft. Bowel sounds are normal. He exhibits no distension. There is no tenderness. There is no rebound and no guarding.   Musculoskeletal: Normal range of motion. He exhibits edema.   Neurological: He is alert and oriented to person, place, and time.   Skin: Skin is warm and dry.   Tattoos   Psychiatric: Memory and affect normal.       Vaccines  Reports hepatitis A and hepatitis B in 2010, unknown if completed  series  Twinrix initiated 3/8/2019, second dose today  Influenza 9/2018  PCV 23 9/2013      Pertinent Lab Results:    Date  CD4/%  HIV Viral Load  10/10/17 687/40% <20  1/2/18  740/41% <20  3/8/19  884/34% 80    Screening:   HBV: Surface antigen neg 3/8/19  HAV  HCV: Known positive   Syphilis   TB:   GCC  Chlamydia         CBC  Date  WBC  HGB  PLAT  4/9/19  5.8  15.1  137      LABS  Date  CR/BUN GFR  E-LYTES   4/9/19  0.62    WNL    Date PT/INR TBili  AlkPh  AST ALT Album  4/9/19  0.3  78  36 40    Lipids   Date  Chol Trig HDL VLDL LDL  4/9/19  156 212 35  79    HgbA1C  5.4 4/9/19    Imaging/Studies:        ASSESSMENT:     Neeraj Galvez is a 59 y.o.  Pt has a past medical history of HIV and HCV, untreated. He also has hypothyroidism and B12 deficiency for which he is receiving injections and was admitted for anemia in April 2018 requiring transfusions.  He was diagnosed with HIV in 2006 and has been treated with Atripla since that time.  He was diagnosed with hepatitis C in approximately 2004.  He reports no missed doses of the Atripla.  His viral load is typically undetectable but was 80 on last labs.  This likely represents a blip but we will continue to monitor.     PLAN:     HIV  --- Continue current ART -discussed transition off Atripla to a new regimen but patient prefers to continue on Atripla for now    --- Labs prior to next visit: ([facility] numbers are provided):   o HIV Viral Load (009961)  o CD4-Lymphocyte Assay (549728)   o Complete Blood Count with differential and platelets  o Comprehensive Metabolic Profile  o Urinalysis   o Hepatitis B core antibody (844093)   o Syphilis screen - any syphilis screen is ok, but if Treponema IgG is positive we also need reflexive RPR quant OR can just get RPR quant ([id number])   o Chlamydia/Gonococcus NAAT (480483) urine  o PPD or Quantiferon Gold - or documentation   o Documentation of CXR if positive PPD    --- Vaccines: Complete the HBV and HAV vaccine series. Tdap,  PCV13, and meningococcal vaccines series once available. (Do not give meningococcal and PCV13 together space by at least 4 weeks.)    HCV   --- Will need to establish if the positive hepatitis C antibodies represent chronic HCV or if he has cleared the virus, if this does represent chronic HCV (which is most likely) we will recommend treatment as has been approved for all patients co-infected with HIV and HCV  HCV Genotype - 520171   HCV viral load - 809257   FibroSure score - 530954 - If patient has fibrosis/cirrhosis then an ultrasound needs to be completed prior to treat       --- RTC in 1 month              Antonette Bolaños M.D.

## 2019-04-25 ASSESSMENT — ENCOUNTER SYMPTOMS
NERVOUS/ANXIOUS: 0
HEADACHES: 0
BRUISES/BLEEDS EASILY: 1
DIARRHEA: 0
COUGH: 0
CHILLS: 0
BACK PAIN: 1
BLURRED VISION: 0
DOUBLE VISION: 0
ABDOMINAL PAIN: 0
VOMITING: 0
SHORTNESS OF BREATH: 0
FEVER: 0
CONSTIPATION: 0
MYALGIAS: 1
NAUSEA: 0

## 2019-07-01 NOTE — PROGRESS NOTES
[facility] HIV TELECONFERENCE CLINIC NOTE -follow-up     Date of Service: 7/1/2019    Referring Physician: NING    Chief Complaint: Follow-up for HIV, hepatitis C    History of Present Illness:     Neeraj Galvez is a 59 y.o.  Pt has a past medical history of HIV and HCV untreated. He also has hypothyroidism and B12 deficiency for which he is receiving injections and was admitted for anemia in April 2018 requiring transfusions.  He was diagnosed with HIV in 2006 and has been treated with Atripla since that time.  He was diagnosed with hepatitis C in approximately 2004.  He reports no missed doses of the Atripla.    Patient does report side effects that could be attributed to the Atripla including depression, nightmares.  Reports no SI.    Patient reports generalized fatigue, chronic lower extremity edema.  Patient also has chronic hepatitis C, and work-up was requested for treatment.   He was diagnosed with hepatitis C in 2009.    Current ART: Atripla   Prior HIV treatment: None   Resistance: Unknown   Diagnosed with HIV: 2006  Risk factors:  MSW  HIV CD4 / viral load at start of treatment: Unknown     HCV genotype: 1 a  HCV resistance: Not available  Prior treatment status: Naïve  Risk factors:  Cirrhosis: FibroSure score 0.73, F3 to F4 cirrhosis  CTP score:  APRI score: 0.709  HCV PCR at start of treatment: 1,640,000 6/2019  HIV Ab: Known positive    Vaccines  Twinrix second dose completed March/2019  Influenza September 2018  PPSV23 in September 2013    Pertinent Lab Results:     Date                 CD4/%             HIV Viral Load  10/10/17          687/40%          <20  1/2/18              740/41%          <20  3/8/19              884/34%          80  6/11/2019 908/36.3% <20     Screening:   HBV: Natural immunity  HAV  HCV: Known positive   Syphilis : Serum RPR nonreactive 6/2019  TB:   GCC  Chlamydia   UA with no proteinuria 6/2019           CBC  Date                 WBC                HGB                 PLAT  4/9/19              5.8                   15.1                 137  6/11/2019 5.8  14.8  134     LABS  Date                 CR/BUN          GFR                 E-LYTES           4/9/19              0.62                                         WNL  6/11/2019 0.74     Date     PT/INR            TBili                 AlkPh               AST     ALT      Album  4/9/19              0.3                   78                    36        40     Lipids   Date                 Chol     Trig      HDL     VLDL   LDL  4/9/19              156      212      35                    79     HgbA1C  5.4 4/9/19    Review of Systems:  All other systems reviewed and are negative expect as noted in HPI    Past Medical History:   Diagnosis Date   • Asymptomatic human immunodeficiency virus (HIV) infection status (HCC)    • B12 deficiency    • Headache(784.0)    • Hepatitis C     No treatment   • HIV (human immunodeficiency virus infection) (HCC) 7/23/2014   • Thyroid disease        Past Surgical History:   Procedure Laterality Date   • APPENDECTOMY     • CHOLECYSTECTOMY     • OPEN REDUCTION      right wrist tendon   • TONSILLECTOMY         Family History   Problem Relation Age of Onset   • Diabetes Mother    • Heart Disease Mother    • Cancer Father         colon       Social History     Social History   • Marital status: Unknown     Spouse name: N/A   • Number of children: N/A   • Years of education: N/A     Occupational History   • Not on file.     Social History Main Topics   • Smoking status: Former Smoker     Quit date: 7/23/2010   • Smokeless tobacco: Never Used   • Alcohol use Yes      Comment: quit 89   • Drug use: Yes     Types: Marijuana, Cocaine, Methamphetamines   • Sexual activity: Not Currently     Partners: Male, Female     Other Topics Concern   • Not on file     Social History Narrative   • No narrative on file       No Known Allergies    Medications:  Current Outpatient Prescriptions on File Prior to Visit   Medication  Sig Dispense Refill   • efavirenz-emtrictabine-tenofovir (ATRIPLA) 600-200-300 MG per tablet Take 1 Tab by mouth every day. 30 Tab 3   • levothyroxine (SYNTHROID) 150 MCG Tab Take 300 mcg by mouth Every morning on an empty stomach.     • cyanocobalamin (VITAMIN B-12) 1000 MCG/ML Solution 1 mL by Intramuscular route every 30 days.  0   • Influenza Vac Split Quad (FLUZONE/FLUARIX) 0.5 ML Suspension injection 0.5 mL by Intramuscular route Once.     • Multiple Vitamin (MULTIVITAMINS PO) Take  by mouth.     • ibuprofen (MOTRIN) 400 MG TABS Take 400 mg by mouth every 6 hours as needed. Indications: Migraine Headache       No current facility-administered medications on file prior to visit.        Physical Exam:   This consultation was conducted utilizing secure and encrypted videoconferencing equipment with the assistance of a trained tele-presenter at the originating site.     Vital Signs: T 98.8 /81 HR 58 RR 18 O2 96% weight 310 pounds  Vital signs reviewed  Constitutional: Patient is oriented to person, place, and time. Appears well-developed and well-nourished. No distress  Head: Atraumatic, normocephalic  Eyes: Conjunctivae normal, EOM intact. Pupils are equal, round, and reactive to light.   Mouth/Throat: Lips without lesions, oropharynx is clear and moist.  Neck: Neck supple. No masses/lymphadenopathy  Cardiovascular: Normal rate, regular rhythm, normal S1S2 and intact distal pulses. No murmur, gallop, or friction rub.  2+ bilateral pitting pedal edema  Pulmonary/Chest: No respiratory distress. Unlabored respiratory effort, lungs clear to auscultation. No wheezes or rales.   Abdominal: Appears distended, remote presenter unable to ascertain if ascites is present soft, non tender. BS + x 4. No masses or hepatosplenomegaly.   Musculoskeletal: No joint tenderness, swelling, erythema, or restriction of motion noted.  Neurological: Alert and oriented to person, place, and time. No gross cranial nerve deficit.    Skin: Tattoos bilateral upper extremities.  Skin is warm and dry. No rashes or embolic phenomena noted on exposed skin  Psychiatric: Normal mood and affect. Behavior is normal.     LABS:  No results found for: WBC, RBC, HEMOGLOBIN, HEMATOCRIT, MCV, MCH, MCHC, MPV  No results found for: SODIUM, POTASSIUM, CHLORIDE, CO2, GLUCOSE, BUN, CREATININE, BUNCREATRAT, GLOMRATE  No results found for: ALKPHOSPHAT, ASTSGOT, ALTSGPT, TBILIRUBIN   No results found for: CPKTOTAL     MICRO:  No results found for: BLOODCULTU, BLDCULT, BCHOLD      Latest pertinent labs were reviewed      ASSESSMENT:      Neeraj Galvez is a 59 y.o.  Pt has a past medical history of HIV and HCV, untreated. He also has hypothyroidism and B12 deficiency for which he is receiving injections and was admitted for anemia in April 2018 requiring transfusions.  He was diagnosed with HIV in 2006 and has been treated with Atripla since that time.  He was diagnosed with hepatitis C in approximately 2004.  He reports no missed doses of the Atripla.  His HIV is relatively well controlled.  Hepatitis C is untreated  PLAN:      HIV  -Both for purposes of better long-term side effect profile and in preparation of treatment of hepatitis C, will change Atripla to p.o. Biktarvy 1 tab daily (ordered)  -Previous detectable viral load was likely a blip, undetectable now  -Please check urine chlamydia/gonococcus NAAT  -Please scan PPD performed in the past year     Vaccines:   -Complete the HBV and HAV vaccine series.   -Tdap, PCV13, and meningococcal vaccines series once available. (Do not give meningococcal and PCV13 together space by at least 4 weeks.)     Chronic HCV, cirrhosis:  -Please check INR  -Please check liver ultrasound (please also request radiologist to comment on the presence of ascites)  -Repeat HIV viral load 4 weeks after switch to Biktarvy.  If remains controlled and liver ultrasound is normal, will be able to initiate treatment for hepatitis C with  p.o. Epclusa 1 tab daily for 12 weeks    Please inform us once the repeat HIV viral load, inr and liver ultrasound are obtained.  If ultrasound not obtained by then, recommend return to clinic in 3 months with repeat HIV viral load, CD4 count, CMP.    Osito Swift M.D.    Please note that this dictation was created using voice recognition software. I have worked with technical experts from [facility] to optimize the interface.  I have made every reasonable attempt to correct obvious errors, but there may be errors of grammar and possibly content that I did not discover before finalizing the note.

## 2019-07-02 ENCOUNTER — TELEMEDICINE2 (OUTPATIENT)
Dept: VASCULAR LAB | Facility: MEDICAL CENTER | Age: 59
End: 2019-07-02
Attending: INTERNAL MEDICINE
Payer: OTHER GOVERNMENT

## 2019-07-02 DIAGNOSIS — B18.2 HEP C W/ COMA, CHRONIC: ICD-10-CM

## 2019-07-02 DIAGNOSIS — B20 HIV (HUMAN IMMUNODEFICIENCY VIRUS INFECTION) (HCC): ICD-10-CM

## 2019-07-02 PROCEDURE — 99215 OFFICE O/P EST HI 40 MIN: CPT | Performed by: INTERNAL MEDICINE

## 2019-11-30 NOTE — PROGRESS NOTES
[facility] HIV TELECONFERENCE CLINIC NOTE     Date of Service: 11/29/2019    Referring Physician: NING    Chief Complaint: Follow-up for HIV and hepatitis C    History of Present Illness:     Neeraj Galvez is a 59 y.o.  Pt has a past medical history of HIV and HCV untreated. He also has hypothyroidism and B12 deficiency for which he is receiving injections and was admitted for anemia in April 2018 requiring transfusions.  He was diagnosed with HIV in 2006 and has been treated with Atripla since that time.  He was diagnosed with hepatitis C in approximately 2004.       Patient reports generalized fatigue, chronic lower extremity edema.  Patient also has chronic hepatitis C, and work-up was requested for treatment.   He was diagnosed with hepatitis C in 2009.    Patient was switched from Atripla to Biktarvy in July 2019.    This visit, patient continues to report fatigue.  He reports no missed doses of Biktarvy.  He states that he takes his multivitamins 4 hours apart from his ART.  Viral load is mildly detectable at 90.     Current ART: Biktarvy  Prior HIV treatment: Atripla  Resistance: Unknown   Diagnosed with HIV: 2006  Risk factors:  MSW  HIV CD4 / viral load at start of treatment: Unknown      HCV genotype: 1 a  HCV resistance: Not available  Prior treatment status: Naïve  Risk factors:  Cirrhosis: FibroSure score 0.73, F3 to F4 cirrhosis  CTP score: Class A  APRI score: 0.709  HCV PCR at start of treatment: 1,640,000 6/2019  HIV Ab: Known positive  Imaging: Liver US with no masses  9/2019     Vaccines  Twinrix second dose completed March/2019  Influenza September 2018  PPSV23 in September 2013     Pertinent Lab Results:     Date                 CD4/%             HIV Viral Load  10/10/17          687/40%          <20  1/2/18              740/41%          <20  3/8/19              884/34%          80  6/11/2019        908/36.3%       <20  8/9/2019   30  11/12/2019 728/36.4% 90     Screening:   HBV: Natural  immunity  HAV: Twinrix completed 2019  HCV: Known positive   Syphilis : Serum RPR nonreactive 6/2019  TB:   GCC  Chlamydia   UA with no proteinuria 6/2019      CBC  Date                 WBC                HGB                PLAT  4/9/19              5.8                   15.1                 137  6/11/2019        5.8                   14.8                 134  11/12/2019 5.2  14.7  129     LABS  Date                 CR/BUN          GFR                 E-LYTES           4/9/19              0.62                                         WNL  6/11/2019        0.74  11/12/2019 0.75     Date     PT/INR            TBili                 AlkPh               AST     ALT      Album  4/9/19                0.3                   78                    36        40  8/9/2019  1.0  11/12/2019    0.4  74  44 43 4.2     Lipids   Date                 Chol     Trig      HDL     VLDL   LDL  4/9/19              156      212      35                    79  11/12/2019 142 198 33 80 69     HgbA1C  5.4 4/9/19     Review of Systems:  All other systems reviewed and are negative expect as noted in HPI    Past Medical History:   Diagnosis Date   • Asymptomatic human immunodeficiency virus (HIV) infection status (HCC)    • B12 deficiency    • Headache(784.0)    • Hepatitis C     No treatment   • HIV (human immunodeficiency virus infection) (HCC) 7/23/2014   • Thyroid disease        Past Surgical History:   Procedure Laterality Date   • APPENDECTOMY     • CHOLECYSTECTOMY     • OPEN REDUCTION      right wrist tendon   • TONSILLECTOMY         Family History   Problem Relation Age of Onset   • Diabetes Mother    • Heart Disease Mother    • Cancer Father         colon       Social History     Socioeconomic History   • Marital status: Unknown     Spouse name: Not on file   • Number of children: Not on file   • Years of education: Not on file   • Highest education level: Not on file   Occupational History   • Not on file   Social Needs   • Financial  resource strain: Not on file   • Food insecurity:     Worry: Not on file     Inability: Not on file   • Transportation needs:     Medical: Not on file     Non-medical: Not on file   Tobacco Use   • Smoking status: Former Smoker     Last attempt to quit: 2010     Years since quittin.3   • Smokeless tobacco: Never Used   Substance and Sexual Activity   • Alcohol use: Yes     Comment: quit 89   • Drug use: Yes     Types: Marijuana, Cocaine, Methamphetamines   • Sexual activity: Not Currently     Partners: Male, Female   Lifestyle   • Physical activity:     Days per week: Not on file     Minutes per session: Not on file   • Stress: Not on file   Relationships   • Social connections:     Talks on phone: Not on file     Gets together: Not on file     Attends Zoroastrianism service: Not on file     Active member of club or organization: Not on file     Attends meetings of clubs or organizations: Not on file     Relationship status: Not on file   • Intimate partner violence:     Fear of current or ex partner: Not on file     Emotionally abused: Not on file     Physically abused: Not on file     Forced sexual activity: Not on file   Other Topics Concern   • Not on file   Social History Narrative   • Not on file       No Known Allergies    Medications:  Current Outpatient Medications on File Prior to Visit   Medication Sig Dispense Refill   • Bictegravir-Emtricitab-Tenofov (BIKTARVY) -25 MG Tab Take 1 Tab by mouth every day. 30 Tab 11   • levothyroxine (SYNTHROID) 150 MCG Tab Take 300 mcg by mouth Every morning on an empty stomach.     • cyanocobalamin (VITAMIN B-12) 1000 MCG/ML Solution 1 mL by Intramuscular route every 30 days.  0   • Influenza Vac Split Quad (FLUZONE/FLUARIX) 0.5 ML Suspension injection 0.5 mL by Intramuscular route Once.     • Multiple Vitamin (MULTIVITAMINS PO) Take  by mouth.     • ibuprofen (MOTRIN) 400 MG TABS Take 400 mg by mouth every 6 hours as needed. Indications: Migraine Headache        No current facility-administered medications on file prior to visit.        Physical Exam:   This consultation was conducted utilizing secure and encrypted videoconferencing equipment with the assistance of a trained tele-presenter at the originating site.   Vital Signs: Wt 317 /78 T 98.2 HR 60 RR 16 o2 94%  Vital signs reviewed  Constitutional: Patient is oriented to person, place, and time. Appears well-developed and well-nourished. No distress  Head: Atraumatic, normocephalic  Eyes: Conjunctivae normal, EOM intact. Pupils are equal, round, and reactive to light.   Mouth/Throat: Lips without lesions, good dentition, oropharynx is clear and moist.  Neck: Neck supple. No masses/lymphadenopathy  Cardiovascular: Normal rate, regular rhythm, normal S1S2 and intact distal pulses. No murmur, gallop, or friction rub. 1+ pitting pedal edema.  Pulmonary/Chest: No respiratory distress. Unlabored respiratory effort, lungs clear to auscultation. No wheezes or rales.   Abdominal: Soft, protuberant, non tender. BS + x 4. No masses or hepatosplenomegaly.   Musculoskeletal: No joint tenderness, swelling, erythema, or restriction of motion noted.   Neurological: Alert and oriented to person, place, and time. No gross cranial nerve deficit. No focal neural deficit noted  Skin: Skin is warm and dry. No rashes or embolic phenomena noted on exposed skin  Psychiatric: Normal mood and affect. Behavior is normal.     LABS:  No results found for: WBC, RBC, HEMOGLOBIN, HEMATOCRIT, MCV, MCH, MCHC, MPV  No results found for: SODIUM, POTASSIUM, CHLORIDE, CO2, GLUCOSE, BUN, CREATININE, BUNCREATRAT, GLOMRATE  No results found for: ALKPHOSPHAT, ASTSGOT, ALTSGPT, TBILIRUBIN   No results found for: CPKTOTAL     MICRO:  No results found for: BLOODCULTU, BLDCULT, BCHOLD      Latest pertinent labs were reviewed    Assessment:   Neeraj Galvez is a 59 y.o.  Pt has a past medical history of HIV and HCV, untreated. He also has hypothyroidism  and B12 deficiency for which he is receiving injections and was admitted for anemia in April 2018 requiring transfusions.  He was diagnosed with HIV in 2006 and was treated with Atripla since that time.  He was diagnosed with hepatitis C in approximately 2004.  He is now on Biktarvy.  His HIV is relatively well controlled, with episodes of blips with mildly detectable viral load.  Hepatitis C is untreated    Plan:   HIV  -Continue p.o. Biktarvy 1 tab daily.  Does have periods of mildly detectable viral load. Please repeat HIV VL to ensure no worsening  -Please check urine chlamydia/gonococcus NAAT  -Please provide latest PPD performed     Vaccines:   -Completed the HBV and HAV vaccine series.   -Tdap, PCV13, and meningococcal vaccines series once available. (Do not give meningococcal and PCV13 together space by at least 4 weeks.)     Chronic HCV, cirrhosis:  -Once repeat HIV viral load reviewed, can start PO Epclusa 1 tab daily x 12 weeks  -Medication interactions: Multivitamins. Pt will stop taking  -At 4 weeks from start of therapy: obtain CMP (note: A 10x increase in ALT at any time should prompt immediate discontinuation of therapy; if ALT rising then test every 2 weeks)   -HCV RNA quantitative PCR at 4 weeks from start of therapy, at completion of therapy, and at 12 weeks after completing therapy   - On all new labs and imaging send documentation once obtained to [facility] and please notify me or Dr. Bolaños   - Patient with cirrhosis will need hepatocellular carcinoma surveillance every 6 months with imaging. Recommend enrolling in GI cirrhosis clinic    Osito Swift M.D.    Please note that this dictation was created using voice recognition software. I have worked with technical experts from [facility] to optimize the interface.  I have made every reasonable attempt to correct obvious errors, but there may be errors of grammar and possibly content that I did not discover before finalizing the  note.

## 2019-12-02 ENCOUNTER — TELEMEDICINE2 (OUTPATIENT)
Dept: VASCULAR LAB | Facility: MEDICAL CENTER | Age: 59
End: 2019-12-02
Attending: INTERNAL MEDICINE
Payer: OTHER GOVERNMENT

## 2019-12-02 DIAGNOSIS — K74.69 COMPENSATED HCV CIRRHOSIS (HCC): ICD-10-CM

## 2019-12-02 DIAGNOSIS — E03.9 HYPOTHYROIDISM, UNSPECIFIED TYPE: ICD-10-CM

## 2019-12-02 DIAGNOSIS — B18.2 CHRONIC HEPATITIS C WITHOUT HEPATIC COMA (HCC): ICD-10-CM

## 2019-12-02 DIAGNOSIS — B19.20 COMPENSATED HCV CIRRHOSIS (HCC): ICD-10-CM

## 2019-12-02 PROCEDURE — 99214 OFFICE O/P EST MOD 30 MIN: CPT | Performed by: INTERNAL MEDICINE

## 2019-12-31 ENCOUNTER — TELEPHONE (OUTPATIENT)
Dept: VASCULAR LAB | Facility: MEDICAL CENTER | Age: 59
End: 2019-12-31

## 2019-12-31 NOTE — TELEPHONE ENCOUNTER
2/4/20: Facility following up on this patient's Epclusa. Infectious Control Nurse, Aretha said that the last viral load was sent 1/17/19. I don't see any new orders have been written, can you look into this please? Thanks    Facility ([facility]) requesting new start Epclusa. If pt viral load has been reviewed and is appropriate to start, please send prescription electronically to [facility]. Thank you

## 2020-02-12 DIAGNOSIS — B18.2 CHRONIC HEPATITIS C WITHOUT HEPATIC COMA (HCC): ICD-10-CM

## 2020-02-12 RX ORDER — VELPATASVIR AND SOFOSBUVIR 100; 400 MG/1; MG/1
1 TABLET, FILM COATED ORAL DAILY
Qty: 84 TAB | Refills: 0 | Status: SHIPPED | OUTPATIENT
Start: 2020-02-12 | End: 2021-02-24

## 2020-02-12 NOTE — PROGRESS NOTES
Repeat HIV PCR reviewed remains very mildly detectable at 30.  Okay to start hepatitis C medication as below:    -Start PO Epclusa 1 tab daily x 12 weeks  -Medication interactions: Multivitamins. Pt will stop taking -please remind patient to stop taking multivitamins for this period of time    -4 weeks from start of therapy: Recommend evaluation by facility care provider - to assess for tolerance and/or side effects related to treatment.  If any concerning symptoms such as fever, jaundice, scleral icterus, abdominal pain, abdominal distention, nausea, vomiting or diarrhea or other, then obtain CMP (note: A 10x increase in ALT at any time should prompt immediate discontinuation of therapy, if ALT rising then test q2 weeks) and notify [facility]    -At any time during therapy if there are concerning signs/symptoms then obtain the above labs and notify [facility]    -If NO concerns then continue treatment and no additional labs are needed until after treatment is complete.     -12 weeks after COMPLETION of  therapy - obtain Hepatitis C virus (HCV) quantitative PCR ([facility] test number [id number])    -Patients with cirrhosis will need hepatocellular carcinoma surveillance every 6 months with imaging +/- labs in accordance with AASLD guidelines

## 2020-05-20 NOTE — PROGRESS NOTES
[facility] HIV TELECONFERENCE CLINIC NOTE     Date of Service: 5/20/2020    Referring Physician: NING    Chief Complaint: Follow-up for HIV and hepatitis C    History of Present Illness:     Neeraj Galvez is a 60 y.o.  Pt has a past medical history of HIV and HCV untreated. He also has hypothyroidism and B12 deficiency for which he is receiving injections and was admitted for anemia in April 2018 requiring transfusions.  He was diagnosed with HIV in 2006 and has been treated with Atripla since that time.  He was diagnosed with hepatitis C in approximately 2004.       Patient reports generalized fatigue, chronic lower extremity edema.  Patient also has chronic hepatitis C, and work-up was requested for treatment.   He was diagnosed with hepatitis C in 2009.     Patient was switched from Atripla to Biktarvy in July 2019.     Pt reports no missed doses of Biktarvy.  He states that he takes his multivitamins 4 hours apart from his ART.  Viral load is mildly detectable at 30.    In February 2020, patient was started on a 12-week course of Epclusa for his hepatitis C. Completed on 5/9/2020. HCV RNA from 5/8 was undetectable.    Patient states he had significant issues with his hypothyroidism while he was on the Epclusa, requiring an increase in his dosage.  He also notes lower extremity edema and a weight gain of 45 pounds over the past 2 months.  Remote RN also noted some shortness of breath on exertion.  Patient's thyroid medication has been increased, and plan is to recheck levels in 6 weeks.     Current ART: Biktarvy  Prior HIV treatment: Atripla  Resistance: Unknown   Diagnosed with HIV: 2006  Risk factors:  MSW  HIV CD4 / viral load at start of treatment: Unknown      HCV genotype: 1 a  HCV resistance: Not available  Prior treatment status: Naïve  Risk factors:  Cirrhosis: FibroSure score 0.73, F3 to F4 cirrhosis  CTP score: Class A  APRI score: 0.709  HCV PCR at start of treatment: 1,640,000 6/2019  HIV Ab:  Known positive  Imaging: Liver US with no masses  9/2019    Vaccines  Immunization History   Administered Date(s) Administered   • Influenza (IM) Preservative Free 12/13/2014   • Influenza Seasonal Injectable 11/10/2015, 10/11/2016   • Influenza Vaccine H1N1 12/10/2009   • Influenza Vaccine Quad Inj (Preserved) 09/27/2018   • Influenza, Unspecified - Historical Data 10/19/2017   • Pneumococcal polysaccharide vaccine (PPSV-23) 09/05/2013, 03/08/2019       Pertinent Lab Results:     Date                 CD4/%             HIV Viral Load  10/10/17          687/40%          <20  1/2/18              740/41%          <20  3/8/19              884/34%          80  6/11/2019        908/36.3%       <20  8/9/2019                                  30  11/12/2019      728/36.4%       90  2/19/2020 1120/40.0% <20  5/8/2020 836/39.8% <20     Screening:   HBV: Natural immunity  HAV: Twinrix completed 2019  HCV: Known positive   Syphilis : Serum RPR nonreactive 6/2019  TB:   GCC  Chlamydia   UA with no proteinuria 5/2020      CBC  Date                 WBC                HGB                PLAT  4/9/19              5.8                   15.1                 137  6/11/2019        5.8                   14.8                 134  11/12/2019      5.2                   14.7                 129  2/19/2020 6.3  14.4  138   5/8/2020 5.5  14.4  125    LABS  Date                 CR/BUN          GFR                 E-LYTES           4/9/19              0.62                                         WNL  6/11/2019        0.74  11/12/2019      0.75  2/19/2020 0.70  5/8/2020 0.92     Date     PT/INR            TBili                 AlkPh               AST     ALT      Album  4/9/19                            0.3                   78                    36        40  8/9/2019          1.0  11/12/2019                    0.4                   74                    44        43        4.2          2020  0.5  74  27 36  2020  0.4  64  21 17     Lipids   Date                 Chol     Trig      HDL     VLDL   LDL  19              156      212      35                    79  2019      142      198      33        80        69     HgbA1C  5.4 19     Review of Systems:  All other systems reviewed and are negative expect as noted in HPI    Past Medical History:   Diagnosis Date   • Asymptomatic human immunodeficiency virus (HIV) infection status (HCC)    • B12 deficiency    • Headache(784.0)    • Hepatitis C     No treatment   • HIV (human immunodeficiency virus infection) (HCC) 2014   • Thyroid disease        Past Surgical History:   Procedure Laterality Date   • APPENDECTOMY     • CHOLECYSTECTOMY     • OPEN REDUCTION      right wrist tendon   • TONSILLECTOMY         Family History   Problem Relation Age of Onset   • Diabetes Mother    • Heart Disease Mother    • Cancer Father         colon       Social History     Socioeconomic History   • Marital status: Unknown     Spouse name: Not on file   • Number of children: Not on file   • Years of education: Not on file   • Highest education level: Not on file   Occupational History   • Not on file   Social Needs   • Financial resource strain: Not on file   • Food insecurity     Worry: Not on file     Inability: Not on file   • Transportation needs     Medical: Not on file     Non-medical: Not on file   Tobacco Use   • Smoking status: Former Smoker     Last attempt to quit: 2010     Years since quittin.8   • Smokeless tobacco: Never Used   Substance and Sexual Activity   • Alcohol use: Yes     Comment: quit 89   • Drug use: Yes     Types: Marijuana, Cocaine, Methamphetamines   • Sexual activity: Not Currently     Partners: Male, Female   Lifestyle   • Physical activity     Days per week: Not on file     Minutes per session: Not on file   • Stress: Not on file   Relationships   • Social connections     Talks on phone: Not on file      Gets together: Not on file     Attends Rastafari service: Not on file     Active member of club or organization: Not on file     Attends meetings of clubs or organizations: Not on file     Relationship status: Not on file   • Intimate partner violence     Fear of current or ex partner: Not on file     Emotionally abused: Not on file     Physically abused: Not on file     Forced sexual activity: Not on file   Other Topics Concern   • Not on file   Social History Narrative   • Not on file       No Known Allergies    Medications:  Current Outpatient Medications on File Prior to Visit   Medication Sig Dispense Refill   • Sofosbuvir-Velpatasvir (EPCLUSA) 400-100 MG Tab Take 1 Tab by mouth every day. 84 Tab 0   • Bictegravir-Emtricitab-Tenofov (BIKTARVY) -25 MG Tab Take 1 Tab by mouth every day. 30 Tab 11   • levothyroxine (SYNTHROID) 150 MCG Tab Take 300 mcg by mouth Every morning on an empty stomach.     • cyanocobalamin (VITAMIN B-12) 1000 MCG/ML Solution 1 mL by Intramuscular route every 30 days.  0   • Influenza Vac Split Quad (FLUZONE/FLUARIX) 0.5 ML Suspension injection 0.5 mL by Intramuscular route Once.     • Multiple Vitamin (MULTIVITAMINS PO) Take  by mouth.     • ibuprofen (MOTRIN) 400 MG TABS Take 400 mg by mouth every 6 hours as needed. Indications: Migraine Headache       No current facility-administered medications on file prior to visit.        Physical Exam:   This consultation was conducted utilizing secure and encrypted videoconferencing equipment with the assistance of a trained tele-presenter at the originating site.     Vital Signs: Wt 345 lbs /82 T 98.3 HR 74 RR 17 o2 96%  Vital signs reviewed  Constitutional: Patient is oriented to person, place, and time. Appears well-developed and well-nourished. No distress  Head: Atraumatic, normocephalic  Eyes: Conjunctivae normal, EOM intact. Pupils are equal, round, and reactive to light.   Mouth/Throat: Lips without lesions, good dentition,  oropharynx is clear and moist.  Neck: Neck supple. No masses/lymphadenopathy  Cardiovascular: Normal rate, regular rhythm, normal S1S2 and intact distal pulses. No murmur, gallop, or friction rub. pedal edema.+  Pulmonary/Chest: No respiratory distress. Unlabored respiratory effort, lungs clear to auscultation. No wheezes or rales.   Abdominal: Soft, non tender. BS + x 4. No masses or hepatosplenomegaly.   Musculoskeletal: No joint tenderness, swelling, erythema, or restriction of motion noted.  Neurological: Alert and oriented to person, place, and time. No gross cranial nerve deficit. No focal neural deficit noted  Skin: Skin is warm and dry. No rashes or embolic phenomena noted on exposed skin  Psychiatric: Normal mood and affect. Behavior is normal.     LABS:  No results found for: WBC, RBC, HEMOGLOBIN, HEMATOCRIT, MCV, MCH, MCHC, MPV  No results found for: SODIUM, POTASSIUM, CHLORIDE, CO2, GLUCOSE, BUN, CREATININE, BUNCREATRAT, GLOMRATE  No results found for: ALKPHOSPHAT, ASTSGOT, ALTSGPT, TBILIRUBIN   No results found for: CPKTOTAL     MICRO:  No results found for: BLOODCULTU, BLDCULT, BCHOLD      Latest pertinent labs were reviewed      Assessment:   Neeraj Galvez is a 60 y.o.  Pt has a past medical history of HIV and HCV, untreated. He also has hypothyroidism and B12 deficiency for which he is receiving injections and was admitted for anemia in April 2018 requiring transfusions.  He was diagnosed with HIV in 2006 and was treated with Atripla since that time.  He was diagnosed with hepatitis C in approximately 2004.  He is now on Biktarvy.  His HIV is relatively well controlled, with episodes of blips with mildly detectable viral load.  Hepatitis C is untreated     Plan:   HIV  -Well-controlled  -Continue p.o. Biktarvy 1 tab daily.    -Please check urine chlamydia/gonococcus NAAT  -Please provide latest PPD performed  -Please check serum RPR     Vaccines:   -Completed the HBV and HAV vaccine  series.   -Tdap, PCV13, and meningococcal vaccines series once available. (Do not give meningococcal and PCV13 together space by at least 4 weeks.)     Chronic HCV, cirrhosis:  -Started PO Epclusa 1 tab daily x 12 weeks 2/2020  -Completed on 5/9/2020. HCV RNA from 5/8 was undetectable.  -Recheck hepatitis C quantitative PCR 12 weeks after completion of therapy (around 7/31/2020)  -Patients with cirrhosis will need hepatocellular carcinoma surveillance every 6 months with imaging +/- labs in accordance with AASLD guidelines    Hypothyroidism, weight gain, lower extremity edema:  -Agree with additional work-up including echocardiogram  -Rapid weight gain likely secondary to fluid retention rather than Biktarvy.  Continue to monitor closely    ID clinic follow-up in 3 months with above labs and repeat CBC with differential, CMP, HIV viral load, CD4 count, serum RPR    Discussed with EVA Carias M.D.    Please note that this dictation was created using voice recognition software. I have worked with technical experts from [facility] to optimize the interface.  I have made every reasonable attempt to correct obvious errors, but there may be errors of grammar and possibly content that I did not discover before finalizing the note.

## 2020-05-21 ENCOUNTER — TELEMEDICINE2 (OUTPATIENT)
Dept: VASCULAR LAB | Facility: MEDICAL CENTER | Age: 60
End: 2020-05-21
Attending: INTERNAL MEDICINE
Payer: OTHER GOVERNMENT

## 2020-05-21 DIAGNOSIS — B18.2 CHRONIC HEPATITIS C WITHOUT HEPATIC COMA (HCC): ICD-10-CM

## 2020-05-21 DIAGNOSIS — B20 HIV (HUMAN IMMUNODEFICIENCY VIRUS INFECTION) (HCC): ICD-10-CM

## 2020-05-21 DIAGNOSIS — B20 HIV DISEASE (HCC): ICD-10-CM

## 2020-05-21 DIAGNOSIS — E03.9 HYPOTHYROIDISM, UNSPECIFIED TYPE: ICD-10-CM

## 2020-05-21 DIAGNOSIS — E66.9 OBESITY WITHOUT SERIOUS COMORBIDITY, UNSPECIFIED CLASSIFICATION, UNSPECIFIED OBESITY TYPE: ICD-10-CM

## 2020-05-21 DIAGNOSIS — K74.69 COMPENSATED HCV CIRRHOSIS (HCC): ICD-10-CM

## 2020-05-21 DIAGNOSIS — B19.20 COMPENSATED HCV CIRRHOSIS (HCC): ICD-10-CM

## 2020-05-21 PROBLEM — R63.4 WEIGHT LOSS: Status: RESOLVED | Noted: 2018-02-07 | Resolved: 2020-05-21

## 2020-05-21 PROCEDURE — 99214 OFFICE O/P EST MOD 30 MIN: CPT | Performed by: INTERNAL MEDICINE

## 2020-05-21 RX ORDER — BICTEGRAVIR SODIUM, EMTRICITABINE, AND TENOFOVIR ALAFENAMIDE FUMARATE 50; 200; 25 MG/1; MG/1; MG/1
1 TABLET ORAL DAILY
Qty: 30 TAB | Refills: 11 | Status: SHIPPED | OUTPATIENT
Start: 2020-05-21 | End: 2021-02-24

## 2020-08-13 PROCEDURE — RXMED WILLOW AMBULATORY MEDICATION CHARGE: Performed by: INTERNAL MEDICINE

## 2020-08-19 ENCOUNTER — PHARMACY VISIT (OUTPATIENT)
Dept: PHARMACY | Facility: MEDICAL CENTER | Age: 60
End: 2020-08-19
Payer: OTHER GOVERNMENT

## 2020-09-16 PROCEDURE — RXMED WILLOW AMBULATORY MEDICATION CHARGE: Performed by: INTERNAL MEDICINE

## 2020-09-17 ENCOUNTER — PHARMACY VISIT (OUTPATIENT)
Dept: PHARMACY | Facility: MEDICAL CENTER | Age: 60
End: 2020-09-17
Payer: OTHER GOVERNMENT

## 2020-10-12 ENCOUNTER — PHARMACY VISIT (OUTPATIENT)
Dept: PHARMACY | Facility: MEDICAL CENTER | Age: 60
End: 2020-10-12
Payer: OTHER GOVERNMENT

## 2020-10-12 PROCEDURE — RXMED WILLOW AMBULATORY MEDICATION CHARGE: Performed by: INTERNAL MEDICINE

## 2020-11-06 ENCOUNTER — PHARMACY VISIT (OUTPATIENT)
Dept: PHARMACY | Facility: MEDICAL CENTER | Age: 60
End: 2020-11-06
Payer: OTHER GOVERNMENT

## 2020-11-06 PROCEDURE — RXMED WILLOW AMBULATORY MEDICATION CHARGE: Performed by: INTERNAL MEDICINE

## 2020-12-11 ENCOUNTER — PHARMACY VISIT (OUTPATIENT)
Dept: PHARMACY | Facility: MEDICAL CENTER | Age: 60
End: 2020-12-11
Payer: OTHER GOVERNMENT

## 2020-12-11 PROCEDURE — RXMED WILLOW AMBULATORY MEDICATION CHARGE: Performed by: INTERNAL MEDICINE

## 2021-01-13 PROCEDURE — RXMED WILLOW AMBULATORY MEDICATION CHARGE: Performed by: INTERNAL MEDICINE

## 2021-01-15 ENCOUNTER — PHARMACY VISIT (OUTPATIENT)
Dept: PHARMACY | Facility: MEDICAL CENTER | Age: 61
End: 2021-01-15
Payer: OTHER GOVERNMENT

## 2021-02-11 PROCEDURE — RXMED WILLOW AMBULATORY MEDICATION CHARGE: Performed by: INTERNAL MEDICINE

## 2021-02-24 ENCOUNTER — TELEMEDICINE2 (OUTPATIENT)
Dept: VASCULAR LAB | Facility: MEDICAL CENTER | Age: 61
End: 2021-02-24
Attending: INTERNAL MEDICINE
Payer: OTHER GOVERNMENT

## 2021-02-24 DIAGNOSIS — B19.20 COMPENSATED HCV CIRRHOSIS (HCC): ICD-10-CM

## 2021-02-24 DIAGNOSIS — E66.9 OBESITY WITHOUT SERIOUS COMORBIDITY, UNSPECIFIED CLASSIFICATION, UNSPECIFIED OBESITY TYPE: ICD-10-CM

## 2021-02-24 DIAGNOSIS — B20 HIV DISEASE (HCC): ICD-10-CM

## 2021-02-24 DIAGNOSIS — E03.9 HYPOTHYROIDISM, UNSPECIFIED TYPE: ICD-10-CM

## 2021-02-24 DIAGNOSIS — K74.69 COMPENSATED HCV CIRRHOSIS (HCC): ICD-10-CM

## 2021-02-24 PROCEDURE — 99214 OFFICE O/P EST MOD 30 MIN: CPT | Performed by: INTERNAL MEDICINE

## 2021-02-24 RX ORDER — BICTEGRAVIR SODIUM, EMTRICITABINE, AND TENOFOVIR ALAFENAMIDE FUMARATE 50; 200; 25 MG/1; MG/1; MG/1
1 TABLET ORAL DAILY
Qty: 30 TABLET | Refills: 11 | Status: SHIPPED | OUTPATIENT
Start: 2021-02-24

## 2021-02-24 NOTE — PROGRESS NOTES
[facility] HIV TELECONFERENCE CLINIC NOTE     Date of Service: 2/24/2021    Referring Physician: [facility]    Chief Complaint: Follow-up for HIV and hepatitis C    History of Present Illness:     This evaluation was conducted via [employer] using secure and encrypted videoconferencing technology. The patient was physically located at [facility] [place], [place]. The patient was presented by a medical professional at the originating site.   The patient's identity was confirmed and verbal consent was obtained for this telemedicine encounter.    Neeraj Galvez is a 60 y.o. male. Pt has a past medical history of HIV and HCV untreated. He also has hypothyroidism and B12 deficiency for which he is receiving injections and was admitted for anemia in April 2018 requiring transfusions.  He was diagnosed with HIV in 2006 and has been treated with Atripla since that time.  He was diagnosed with hepatitis C in approximately 2004.       Patient was switched from Atripla to Biktarvy in July 2019.     Pt reports no missed doses of Biktarvy.  He has been taking it daily with no issues.     In February 2020, patient was started on a 12-week course of Epclusa for his hepatitis C. Completed on 5/9/2020. HCV RNA from 5/8 was undetectable.     Patient states he had significant issues with his hypothyroidism while he was on the Epclusa, requiring an increase in his dosage.  He also noted lower extremity edema and a weight gain of 45 pounds over the prior 2 months last visit. Patient's thyroid medication was increased, and levels are being followed by onsite provider.    Today, patient's weight has decreased from 354 to 347 pounds.  However, for high cholesterol and sugars, he was also started on simvastatin and Metformin by his onsite provider.  He is also on furosemide for his lower extremity edema.     Current ART: Biktarvy  Prior HIV treatment: Atripla  Resistance: Unknown   Diagnosed with HIV: 2006  Risk  factors:  MSW  HIV CD4 / viral load at start of treatment: Unknown      HCV genotype: 1 a  HCV resistance: Not available  Prior treatment status: Naïve  Risk factors:  Cirrhosis: FibroSure score 0.73, F3 to F4 cirrhosis  CTP score: Class A  APRI score: 0.709  HCV PCR at start of treatment: 1,640,000 6/2019  HIV Ab: Known positive  Imaging: Liver US with no masses  9/2019     Vaccines       Immunization History   Administered Date(s) Administered   • Influenza (IM) Preservative Free 12/13/2014   • Influenza Seasonal Injectable 11/10/2015, 10/11/2016   • Influenza Vaccine H1N1 12/10/2009   • Influenza Vaccine Quad Inj (Preserved) 09/27/2018   • Influenza, Unspecified - Historical Data 10/19/2017   • Pneumococcal polysaccharide vaccine (PPSV-23) 09/05/2013, 03/08/2019         Pertinent Lab Results:     Date                 CD4/%             HIV Viral Load  10/10/17          687/40%          <20  1/2/18              740/41%          <20  3/8/19              884/34%          80  6/11/2019        908/36.3%       <20  8/9/2019                                  30  11/12/2019      728/36.4%       90  2/19/2020        1120/40.0%     <20  5/8/2020          836/39.8%       <20  2/5/2021 792/36% <20     Screening:   HBV: Natural immunity  HAV: Twinrix completed 2019  HCV: Known positive   Syphilis : Serum RPR nonreactive 6/2019  TB: PPD - September 2020  GCC: Nonreactive February 2021  Chlamydia   UA with no proteinuria 5/2020      CBC  Date                 WBC                HGB                PLAT  4/9/19              5.8                   15.1                 137  6/11/2019        5.8                   14.8                 134  11/12/2019      5.2                   14.7                 129  2/19/2020        6.3                   14.4                 138   5/8/2020          5.5                   14.4                  125   2/5/2021 5.7  14.8  155     LABS  Date                 CR/BUN          GFR                 E-LYTES           4/9/19              0.62                                         WNL  6/11/2019        0.74  11/12/2019      0.75  2/19/2020        0.70  5/8/2020          0.92   2/5/2021 0.95     Date     PT/INR            TBili                 AlkPh               AST     ALT      Album  4/9/19                            0.3                   78                    36        40  8/9/2019           1.0  11/12/2019                    0.4                   74                    44        43        4.2         2/19/2020                    0.5                   74                    27        36  5/8/2020                      0.4                   64                    21        17   2/5/2021  0.4  80  20 19     Lipids   Date                 Chol     Trig      HDL     VLDL   LDL  4/9/19              156      212      35                    79  11/12/2019      142      198      33        80        69   2/5/2021 200 298 32 52 116     HgbA1C  5.4 4/9/19  6.2% February 2021     Review of Systems:  All other systems reviewed and are negative expect as noted in HPI    Past Medical History:   Diagnosis Date   • Asymptomatic human immunodeficiency virus (HIV) infection status (HCC)    • B12 deficiency    • Headache(784.0)    • Hepatitis C     No treatment   • HIV (human immunodeficiency virus infection) (HCC) 7/23/2014   • Thyroid disease        Past Surgical History:   Procedure Laterality Date   • APPENDECTOMY     • CHOLECYSTECTOMY     • OPEN REDUCTION      right wrist tendon   • TONSILLECTOMY         Family History   Problem Relation Age of Onset   • Diabetes Mother    • Heart Disease Mother    • Cancer Father         colon       Social History     Socioeconomic History   • Marital status: Unknown     Spouse name: Not on file   • Number of children: Not on file   • Years of education: Not on file   • Highest education level: Not  on file   Occupational History   • Not on file   Tobacco Use   • Smoking status: Former Smoker     Quit date: 7/23/2010     Years since quitting: 10.5   • Smokeless tobacco: Never Used   Substance and Sexual Activity   • Alcohol use: Yes     Comment: quit 89   • Drug use: Yes     Types: Marijuana, Cocaine, Methamphetamines   • Sexual activity: Not Currently     Partners: Male, Female   Other Topics Concern   • Not on file   Social History Narrative   • Not on file     Social Determinants of Health     Financial Resource Strain:    • Difficulty of Paying Living Expenses:    Food Insecurity:    • Worried About Running Out of Food in the Last Year:    • Ran Out of Food in the Last Year:    Transportation Needs:    • Lack of Transportation (Medical):    • Lack of Transportation (Non-Medical):    Physical Activity:    • Days of Exercise per Week:    • Minutes of Exercise per Session:    Stress:    • Feeling of Stress :    Social Connections:    • Frequency of Communication with Friends and Family:    • Frequency of Social Gatherings with Friends and Family:    • Attends Caodaism Services:    • Active Member of Clubs or Organizations:    • Attends Club or Organization Meetings:    • Marital Status:    Intimate Partner Violence:    • Fear of Current or Ex-Partner:    • Emotionally Abused:    • Physically Abused:    • Sexually Abused:        No Known Allergies    Medications:  Current Outpatient Medications on File Prior to Visit   Medication Sig Dispense Refill   • bictegravir-emtricitab-TAF (BIKTARVY) -25 mg Tab tablet TAKE ONE TABLET BY MOUTH ONCE DAILY 30 Tab 11   • bictegravir-emtricitab-TAF (BIKTARVY) -25 mg Tab tablet Take 1 Tab by mouth every day. 30 Tab 11   • Bictegravir-Emtricitab-Tenofov (BIKTARVY) -25 MG Tab Take 1 Tab by mouth every day. 30 Tab 11   • Sofosbuvir-Velpatasvir (EPCLUSA) 400-100 MG Tab Take 1 Tab by mouth every day. 84 Tab 0   • levothyroxine (SYNTHROID) 150 MCG Tab Take 300 mcg  by mouth Every morning on an empty stomach.     • cyanocobalamin (VITAMIN B-12) 1000 MCG/ML Solution 1 mL by Intramuscular route every 30 days.  0   • Influenza Vac Split Quad (FLUZONE/FLUARIX) 0.5 ML Suspension injection 0.5 mL by Intramuscular route Once.     • Multiple Vitamin (MULTIVITAMINS PO) Take  by mouth.     • ibuprofen (MOTRIN) 400 MG TABS Take 400 mg by mouth every 6 hours as needed. Indications: Migraine Headache       No current facility-administered medications on file prior to visit.       Physical Exam:   This consultation was conducted utilizing secure and encrypted videoconferencing equipment with the assistance of a trained tele-presenter at the originating site.     Vital Signs: /85 T 98.3 HR 60 RR 18 o2 98% Wt 354 --> 345  Vital signs reviewed  Constitutional: Patient is oriented to person, place, and time. Appears well-developed and well-nourished. No distress  Head: Atraumatic, normocephalic  Eyes: Conjunctivae normal, EOM intact.   Mouth/Throat: Lips without lesions  Neck: Neck supple. No masses/lymphadenopathy  Cardiovascular: Normal rate, regular rhythm, normal S1S2 and intact distal pulses. No murmur, gallop, or friction rub.  2+ bilateral pitting pedal edema  Pulmonary/Chest: No respiratory distress. Unlabored respiratory effort, lungs clear to auscultation. No wheezes or rales.   Abdominal: Soft, non tender, large pannus. BS + x 4. No masses or hepatosplenomegaly.   Musculoskeletal: No joint tenderness, swelling, erythema, or restriction of motion noted.  Neurological: Alert and oriented to person, place, and time. No gross cranial nerve deficit.   Skin: Skin is warm and dry. No rashes or embolic phenomena noted on exposed skin  Psychiatric: Very pleasant.  Normal mood and affect. Behavior is normal.     LABS:  No results found for: WBC, RBC, HEMOGLOBIN, HEMATOCRIT, MCV, MCH, MCHC, MPV  No results found for: SODIUM, POTASSIUM, CHLORIDE, CO2, GLUCOSE, BUN, CREATININE, BUNCREATRAT,  GLOMRATE  No results found for: ALKPHOSPHAT, ASTSGOT, ALTSGPT, TBILIRUBIN   No results found for: CPKTOTAL     MICRO:  No results found for: BLOODCULTU, BLDCULT, BCHOLD      Latest pertinent labs were reviewed        Assessment:   Neeraj Galvez is a 60 y.o.  Pt has a past medical history of HIV and HCV, untreated. He also has hypothyroidism and B12 deficiency for which he is receiving injections and was admitted for anemia in April 2018 requiring transfusions.  He was diagnosed with HIV in 2006 and was treated with Atripla since that time.  He was diagnosed with hepatitis C in approximately 2004.  He is now on Biktarvy.  His HIV is relatively well controlled, with episodes of blips with mildly detectable viral load.  Hepatitis C has been successfully treated.     Plan:   HIV  -Well-controlled  -Please check serum RPR     Vaccines:   -Completed the HBV and HAV vaccine series.   -Tdap, PCV13, and meningococcal vaccines series once available. (Do not give meningococcal and PCV13 together space by at least 4 weeks.)     Chronic HCV, cirrhosis:  -Started PO Epclusa 1 tab daily x 12 weeks 2/2020  -Completed on 5/9/2020. HCV RNA from 5/8 was undetectable.  -Repeat hepatitis C quantitative PCR February 2021 remains undetectable.  Patient is now considered cured of hepatitis C  -Patients with cirrhosis will need hepatocellular carcinoma surveillance every 6 months with imaging +/- labs in accordance with AASLD guidelines     Hypothyroidism, weight gain, lower extremity edema:  -Agree with additional work-up including echocardiogram  -Rapid weight gain likely secondary to fluid retention rather than Biktarvy.   -Weight has improved now from 354 to 347 pounds.  Patient is on furosemide, now also on simvastatin and Metformin.  Hypothyroidism also being managed by onsite provider  -Some contribution of weight gain by Biktarvy has not been ruled out.  If despite control of the above comorbidities, the weight does not come back  down, may need to consider switching to an HIV regimen without any integrase inhibitor    Prediabetes:  -This is new.  Hemoglobin A1c of 6.2%.  Patient has been started on Metformin and simvastatin by onsite provider.  Repeat hemoglobin A1c next visit  -Note interaction with Biktarvy.  Biktarvy may increase serum concentrations of Metformin.  Continue close monitoring, this is not an absolute contraindication    In 3 months, repeat CBC with differential, CMP, CD4 count, HIV viral load, hemoglobin A1c, serum RPR     Discussed with EVA Carias M.D.    Please note that this dictation was created using voice recognition software. I have worked with technical experts from [facility] to optimize the interface.  I have made every reasonable attempt to correct obvious errors, but there may be errors of grammar and possibly content that I did not discover before finalizing the note.

## 2021-03-01 ENCOUNTER — PHARMACY VISIT (OUTPATIENT)
Dept: PHARMACY | Facility: MEDICAL CENTER | Age: 61
End: 2021-03-01
Payer: OTHER GOVERNMENT

## 2021-03-08 PROCEDURE — RXMED WILLOW AMBULATORY MEDICATION CHARGE: Performed by: INTERNAL MEDICINE

## 2021-03-11 ENCOUNTER — PHARMACY VISIT (OUTPATIENT)
Dept: PHARMACY | Facility: MEDICAL CENTER | Age: 61
End: 2021-03-11
Payer: OTHER GOVERNMENT

## 2021-04-15 ENCOUNTER — PHARMACY VISIT (OUTPATIENT)
Dept: PHARMACY | Facility: MEDICAL CENTER | Age: 61
End: 2021-04-15
Payer: OTHER GOVERNMENT

## 2021-04-15 PROCEDURE — RXMED WILLOW AMBULATORY MEDICATION CHARGE: Performed by: INTERNAL MEDICINE
